# Patient Record
Sex: FEMALE | Race: WHITE
[De-identification: names, ages, dates, MRNs, and addresses within clinical notes are randomized per-mention and may not be internally consistent; named-entity substitution may affect disease eponyms.]

---

## 2017-09-18 ENCOUNTER — HOSPITAL ENCOUNTER (OUTPATIENT)
Dept: HOSPITAL 44 - LAB | Age: 82
End: 2017-09-18
Attending: INTERNAL MEDICINE
Payer: MEDICARE

## 2017-09-18 DIAGNOSIS — M25.512: Primary | ICD-10-CM

## 2017-09-18 LAB
BASOPHILS NFR BLD: 0.3 % (ref 0–1.5)
EOSINOPHIL NFR BLD: 5.1 % (ref 0–6.8)
MCH RBC QN AUTO: 26.9 PG (ref 28–34)
MCV RBC AUTO: 84.2 FL (ref 80–100)
MONOCYTES %: 4.3 % (ref 0–11)
NEUTROPHILS #: 5.6 # K/UL (ref 1.4–7.7)

## 2017-09-18 PROCEDURE — 36415 COLL VENOUS BLD VENIPUNCTURE: CPT

## 2017-09-18 PROCEDURE — 73030 X-RAY EXAM OF SHOULDER: CPT

## 2017-09-18 PROCEDURE — 85025 COMPLETE CBC W/AUTO DIFF WBC: CPT

## 2017-09-18 NOTE — DIAGNOSTIC IMAGING REPORT
[facility]

[address] [facility].[address] [address]

 

 

 

 

Report Submission Date: Sep 18, 2017 4:11:19 PM CDT

Patient       Study

Name:   RAS TAFOYA       Date:   Sep 18, 2017 2:57:09 PM CDT

MRN:   [id number]       Modality Type:   CR

Gender:   F       Description:   SHOULDER

:   37       Institution:   [facility]

Physician:   VICKIE CARROLL

         

 

 

Examination: Plain film shoulder 



History: 



Comparison exams: None provided 



Findings: 3 views of the shoulder demonstrates ossific spurring involving the 
humeral head. Acromioclavicular joint degenerative changes. No evidence for 
fracture line. No dislocation. No soft tissue abnormality 



Impression: Degenerative changes. No fracture or dislocation.  If suspect soft 
tissue abnormality, recommend obtaining MRI.

 

Electronically signed on Sep 18, 2017 4:11:19 PM CDT by:

Shon CANADA

## 2018-03-02 ENCOUNTER — HOSPITAL ENCOUNTER (OUTPATIENT)
Dept: HOSPITAL 44 - LAB | Age: 83
End: 2018-03-02
Attending: INTERNAL MEDICINE
Payer: MEDICARE

## 2018-03-02 DIAGNOSIS — D64.9: Primary | ICD-10-CM

## 2018-03-02 LAB
BASOPHILS NFR BLD: 0.4 % (ref 0–1.5)
EGFR (AFRICAN): > 60
EGFR (NON-AFRICAN): > 60
EOSINOPHIL NFR BLD: 4.2 % (ref 0–6.8)
MCH RBC QN AUTO: 26.4 PG (ref 28–34)
MCV RBC AUTO: 85 FL (ref 80–100)
MONOCYTES %: 3.6 % (ref 0–11)
NEUTROPHILS #: 4.8 # K/UL (ref 1.4–7.7)

## 2018-03-02 PROCEDURE — 85025 COMPLETE CBC W/AUTO DIFF WBC: CPT

## 2018-03-02 PROCEDURE — 36415 COLL VENOUS BLD VENIPUNCTURE: CPT

## 2018-03-02 PROCEDURE — 80053 COMPREHEN METABOLIC PANEL: CPT

## 2018-12-11 ENCOUNTER — HOSPITAL ENCOUNTER (EMERGENCY)
Dept: HOSPITAL 44 - ED | Age: 83
Discharge: HOME | End: 2018-12-11
Payer: MEDICARE

## 2018-12-11 VITALS
DIASTOLIC BLOOD PRESSURE: 99 MMHG | SYSTOLIC BLOOD PRESSURE: 183 MMHG | DIASTOLIC BLOOD PRESSURE: 99 MMHG | SYSTOLIC BLOOD PRESSURE: 183 MMHG | SYSTOLIC BLOOD PRESSURE: 183 MMHG | DIASTOLIC BLOOD PRESSURE: 99 MMHG

## 2018-12-11 DIAGNOSIS — E87.70: Primary | ICD-10-CM

## 2018-12-11 LAB
BASOPHILS NFR BLD: 0.2 % (ref 0–1.5)
EGFR (NON-AFRICAN): > 60
EOSINOPHIL NFR BLD: 2.7 % (ref 0–6.8)
MCH RBC QN AUTO: 23.7 PG (ref 28–34)
MCV RBC AUTO: 74 FL (ref 80–100)
MONOCYTES %: 6.1 % (ref 0–11)
NEUTROPHILS #: 6.1 # K/UL (ref 1.4–7.7)

## 2018-12-11 PROCEDURE — 36415 COLL VENOUS BLD VENIPUNCTURE: CPT

## 2018-12-11 PROCEDURE — 85025 COMPLETE CBC W/AUTO DIFF WBC: CPT

## 2018-12-11 PROCEDURE — 83880 ASSAY OF NATRIURETIC PEPTIDE: CPT

## 2018-12-11 PROCEDURE — 71045 X-RAY EXAM CHEST 1 VIEW: CPT

## 2018-12-11 PROCEDURE — 80053 COMPREHEN METABOLIC PANEL: CPT

## 2018-12-11 PROCEDURE — 93005 ELECTROCARDIOGRAM TRACING: CPT

## 2018-12-11 PROCEDURE — S1016 NON-PVC INTRAVENOUS ADMINIST: HCPCS

## 2018-12-11 PROCEDURE — 96374 THER/PROPH/DIAG INJ IV PUSH: CPT

## 2018-12-11 PROCEDURE — 84484 ASSAY OF TROPONIN QUANT: CPT

## 2018-12-11 PROCEDURE — 99285 EMERGENCY DEPT VISIT HI MDM: CPT

## 2018-12-11 PROCEDURE — 99283 EMERGENCY DEPT VISIT LOW MDM: CPT

## 2018-12-11 NOTE — DIAGNOSTIC IMAGING REPORT
FRENCH BASS

                         [facility]

                           [address] [address]

                           [place], [place]. [address]









Report Submission Date: Dec 11, 2018 3:24:14 PM CST







Patient  Study  

 

Name: RAS TAFOYA  Date: Dec 11, 2018 2:58:45 PM CST

 

MRN: [id number]  Modality Type: DX

 

Gender: F  Description: CHEST

 

: 37  Institution: [facility]

 

Physician: FRENCH BASS  Accession: [id number]







Examination: Portable chest



History: Evaluate lungs HIGH BLOOD PRESSURE PT UNABLE TO RAISE HEAD ENOUGH FOR 
CXR (Hx)



Comparison exam: None provided.



Findings: Single view of the chest demonstrates a prominent cardiac and 
mediastinal silhouette. Bilateral perihilar interstitial prominence. No definite
blunting of the costophrenic margins. Acromioclavicular joint degenerative 
spurring.



Impression: Bihilar fullness suggesting increased volume status. No gross 
effusion.



Electronically signed on Dec 11, 2018 3:24:14 PM CST by:

Shon CANADA

## 2018-12-11 NOTE — ED PHYSICIAN DOCUMENTATION
General Adult





- HISTORIAN


Historian: patient





- HPI


Stated Complaint: high blood pressure


Chief Complaint: General Adult


Additional Information: 





Patient presents to ED via outpatient infusion with elevated blood pressure.  

Patient reported to outpatient for IV Iron infusion today.  Upon arrival blood 

pressure was elevated 190s/110s.  Patient admits to checking her blood pressure 

multiple time over night all with elevated readings in the 190s/110s.  She 

admits to not taking her lasix daily like she should.  She take losartan daily. 

Patient has a history of atrial fibrillation and is on Eliquis.  She is not on 

any rate controlling medication.  Patient also has sleep apnea and recently had 

a sleep study, she is getting a new bipap machine because her sleep apnea is 

worse.  


Onset: hours (24)


Timing: still present


Severity: mild





- ROS


CONST: denies: fever, weakness


EYES/ENT: denies: sore throat


CVS/RESP: denies: chest pain, shortness of breath


GI/: denies: abdominal pain, vomiting, nausea


MS/SKIN/LYMPH: ankle swelling


NEURO/PSYCH: denies: headache





- PAST HX


Past History: A-Fib, hypertension


Other History: none


Surgeries/Procedures: none


Home Medications: 


                                Ambulatory Orders











 Medication  Instructions  Recorded


 


Apixaban [Eliquis] 1 tab PO BID 12/11/18


 


Aspirin [Samuel] 1 tab PO DAILY 12/11/18


 


Esomeprazole Magnesium [Nexium] 1 tab PO BID 12/11/18


 


Furosemide [Lasix] 20 mg PO BID 12/11/18


 


Losartan Potassium [Cozaar] 1 tab PO DAILY 12/11/18


 


Potassium Chloride [Klor-Con M20] 1 tab PO DAILY 12/11/18














- SOCIAL HX


Smoking History: non-smoker


Alcohol Use: none


Drug Use: none





- FAMILY HX


Family History: No





- REVIEWED ASSESSMENTS


Nursing Assessment  Reviewed: Yes


Vitals Reviewed: Yes





Progress





- Progress


Progress: 





1647  Patient diuresed considerable amount of fluid.  BP improved. 





- EKG/XRAY/CT


Comments: afib 91bpm, RBBB





ED Results Lab/Radiology





- Radiology


Radiology Impressions: 





Examination: Portable chest 





History: Evaluate lungs  HIGH BLOOD PRESSURE PT UNABLE TO RAISE HEAD ENOUGH FOR 

CXR (Hx) 





Comparison exam: None provided. 





Findings: Single view of the chest demonstrates a prominent cardiac and 

mediastinal silhouette. Bilateral perihilar interstitial prominence. No definite

blunting of the costophrenic margins. Acromioclavicular joint degenerative 

spurring. 





Impression: Bihilar fullness suggesting increased volume status. No gross 

effusion.


 


Electronically signed on Dec 11, 2018 3:24:14 PM CST by:


Shon Baker





General Adult Physical Exam





- PHYSICAL EXAM


GENERAL APPEARANCE: no distress


EENT: CAROL


NECK: supple


RESPIRATORY: wheezes (bibasilar, expiratory )


CVS: no murmur, irregularly irregular rhy


ABDOMEN: soft, non-tender, other (obese)


BACK: no CVA tenderness


SKIN: warm/dry, normal color


EXTREMITIES: non-tender, normal range of motion, no evidence of injury, edema 

(+1 bilaterally to knee)


NEURO: oriented X3, motor nml, sensation nml





Discharge


Clincal Impression: 


Volume overload


Qualifiers:


 Hypervolemia type: unspecified Qualified Code(s): E87.70 - Fluid overload, 

unspecified





Referrals: 


Miguel Marcus MD [Primary Care Provider] - 2 Days


Additional Instructions: 


1.  Take your Lasix daily with your potassium as prescribed previously by your 

doctor. 


2.  Weigh yourself daily.   If you gain more than 3 pounds over 24 hours or 5 

pounds over 3 days call your doctor.


3.  Follow up as scheduled with your cardiologist


4.  Follow up with PCP  within 1 week.  


Condition: Stable


Disposition: 01 HOME, SELF-CARE


Decision to Admit: NO


Date of Decison to Admit: 12/11/18


Decision Time: 16:45

## 2019-01-18 ENCOUNTER — HOSPITAL ENCOUNTER (OUTPATIENT)
Dept: HOSPITAL 44 - INF | Age: 84
End: 2019-01-18
Attending: INTERNAL MEDICINE
Payer: MEDICARE

## 2019-01-18 DIAGNOSIS — D50.9: Primary | ICD-10-CM

## 2019-01-18 PROCEDURE — 96365 THER/PROPH/DIAG IV INF INIT: CPT

## 2019-01-18 PROCEDURE — S1016 NON-PVC INTRAVENOUS ADMINIST: HCPCS

## 2019-01-24 ENCOUNTER — HOSPITAL ENCOUNTER (OUTPATIENT)
Dept: HOSPITAL 44 - INF | Age: 84
End: 2019-01-24
Attending: INTERNAL MEDICINE
Payer: MEDICARE

## 2019-01-24 DIAGNOSIS — Z53.8: ICD-10-CM

## 2019-01-24 DIAGNOSIS — D50.9: Primary | ICD-10-CM
